# Patient Record
Sex: FEMALE | Race: OTHER | HISPANIC OR LATINO | URBAN - METROPOLITAN AREA
[De-identification: names, ages, dates, MRNs, and addresses within clinical notes are randomized per-mention and may not be internally consistent; named-entity substitution may affect disease eponyms.]

---

## 2024-01-23 ENCOUNTER — OUTPATIENT (OUTPATIENT)
Dept: INPATIENT UNIT | Facility: HOSPITAL | Age: 34
LOS: 1 days | Discharge: HOME | End: 2024-01-23
Payer: MEDICAID

## 2024-01-23 DIAGNOSIS — O26.899 OTHER SPECIFIED PREGNANCY RELATED CONDITIONS, UNSPECIFIED TRIMESTER: ICD-10-CM

## 2024-01-23 PROCEDURE — 59025 FETAL NON-STRESS TEST: CPT | Mod: 26

## 2024-01-23 PROCEDURE — 99222 1ST HOSP IP/OBS MODERATE 55: CPT | Mod: 25

## 2024-01-24 ENCOUNTER — ASOB RESULT (OUTPATIENT)
Age: 34
End: 2024-01-24

## 2024-01-24 ENCOUNTER — EMERGENCY (EMERGENCY)
Facility: HOSPITAL | Age: 34
LOS: 1 days | Discharge: STILL ADMITTED OR ADMINISTRATIVE | End: 2024-01-24
Admitting: EMERGENCY MEDICINE
Payer: MEDICAID

## 2024-01-24 ENCOUNTER — APPOINTMENT (OUTPATIENT)
Dept: ANTEPARTUM | Facility: CLINIC | Age: 34
End: 2024-01-24
Payer: MEDICAID

## 2024-01-24 ENCOUNTER — OUTPATIENT (OUTPATIENT)
Dept: INPATIENT UNIT | Facility: HOSPITAL | Age: 34
LOS: 1 days | Discharge: HOME | End: 2024-01-24
Payer: MEDICAID

## 2024-01-24 VITALS — HEART RATE: 76 BPM | DIASTOLIC BLOOD PRESSURE: 55 MMHG | SYSTOLIC BLOOD PRESSURE: 104 MMHG

## 2024-01-24 VITALS
RESPIRATION RATE: 18 BRPM | HEART RATE: 85 BPM | SYSTOLIC BLOOD PRESSURE: 112 MMHG | DIASTOLIC BLOOD PRESSURE: 66 MMHG | TEMPERATURE: 98 F

## 2024-01-24 VITALS
SYSTOLIC BLOOD PRESSURE: 98 MMHG | HEART RATE: 93 BPM | RESPIRATION RATE: 16 BRPM | DIASTOLIC BLOOD PRESSURE: 69 MMHG | TEMPERATURE: 99 F

## 2024-01-24 VITALS — DIASTOLIC BLOOD PRESSURE: 55 MMHG | HEART RATE: 84 BPM | SYSTOLIC BLOOD PRESSURE: 99 MMHG

## 2024-01-24 VITALS
OXYGEN SATURATION: 100 % | RESPIRATION RATE: 24 BRPM | DIASTOLIC BLOOD PRESSURE: 75 MMHG | TEMPERATURE: 98 F | HEART RATE: 97 BPM | SYSTOLIC BLOOD PRESSURE: 112 MMHG

## 2024-01-24 DIAGNOSIS — Z98.891 HISTORY OF UTERINE SCAR FROM PREVIOUS SURGERY: Chronic | ICD-10-CM

## 2024-01-24 DIAGNOSIS — O26.899 OTHER SPECIFIED PREGNANCY RELATED CONDITIONS, UNSPECIFIED TRIMESTER: ICD-10-CM

## 2024-01-24 LAB
ALBUMIN SERPL ELPH-MCNC: 3.5 G/DL — SIGNIFICANT CHANGE UP (ref 3.3–5)
ALBUMIN SERPL ELPH-MCNC: 4.1 G/DL — SIGNIFICANT CHANGE UP (ref 3.3–5)
ALP SERPL-CCNC: 51 U/L — SIGNIFICANT CHANGE UP (ref 40–120)
ALP SERPL-CCNC: 52 U/L — SIGNIFICANT CHANGE UP (ref 40–120)
ALT FLD-CCNC: 11 U/L — SIGNIFICANT CHANGE UP (ref 4–33)
ALT FLD-CCNC: 7 U/L — SIGNIFICANT CHANGE UP (ref 4–33)
AMYLASE P1 CFR SERPL: 52 U/L — SIGNIFICANT CHANGE UP (ref 25–125)
AMYLASE P1 CFR SERPL: 75 U/L — SIGNIFICANT CHANGE UP (ref 25–125)
ANION GAP SERPL CALC-SCNC: 10 MMOL/L — SIGNIFICANT CHANGE UP (ref 7–14)
ANION GAP SERPL CALC-SCNC: 12 MMOL/L — SIGNIFICANT CHANGE UP (ref 7–14)
APPEARANCE UR: CLEAR — SIGNIFICANT CHANGE UP
AST SERPL-CCNC: 12 U/L — SIGNIFICANT CHANGE UP (ref 4–32)
AST SERPL-CCNC: 16 U/L — SIGNIFICANT CHANGE UP (ref 4–32)
BASOPHILS # BLD AUTO: 0.03 K/UL — SIGNIFICANT CHANGE UP (ref 0–0.2)
BASOPHILS # BLD AUTO: 0.04 K/UL — SIGNIFICANT CHANGE UP (ref 0–0.2)
BASOPHILS NFR BLD AUTO: 0.2 % — SIGNIFICANT CHANGE UP (ref 0–2)
BASOPHILS NFR BLD AUTO: 0.3 % — SIGNIFICANT CHANGE UP (ref 0–2)
BILIRUB SERPL-MCNC: 0.3 MG/DL — SIGNIFICANT CHANGE UP (ref 0.2–1.2)
BILIRUB SERPL-MCNC: 0.4 MG/DL — SIGNIFICANT CHANGE UP (ref 0.2–1.2)
BILIRUB UR-MCNC: NEGATIVE — SIGNIFICANT CHANGE UP
BUN SERPL-MCNC: 4 MG/DL — LOW (ref 7–23)
BUN SERPL-MCNC: 6 MG/DL — LOW (ref 7–23)
CALCIUM SERPL-MCNC: 9.1 MG/DL — SIGNIFICANT CHANGE UP (ref 8.4–10.5)
CALCIUM SERPL-MCNC: 9.4 MG/DL — SIGNIFICANT CHANGE UP (ref 8.4–10.5)
CHLORIDE SERPL-SCNC: 101 MMOL/L — SIGNIFICANT CHANGE UP (ref 98–107)
CHLORIDE SERPL-SCNC: 102 MMOL/L — SIGNIFICANT CHANGE UP (ref 98–107)
CO2 SERPL-SCNC: 21 MMOL/L — LOW (ref 22–31)
CO2 SERPL-SCNC: 22 MMOL/L — SIGNIFICANT CHANGE UP (ref 22–31)
COLOR SPEC: YELLOW — SIGNIFICANT CHANGE UP
CREAT SERPL-MCNC: 0.39 MG/DL — LOW (ref 0.5–1.3)
CREAT SERPL-MCNC: 0.39 MG/DL — LOW (ref 0.5–1.3)
DIFF PNL FLD: NEGATIVE — SIGNIFICANT CHANGE UP
EGFR: 134 ML/MIN/1.73M2 — SIGNIFICANT CHANGE UP
EGFR: 134 ML/MIN/1.73M2 — SIGNIFICANT CHANGE UP
EOSINOPHIL # BLD AUTO: 0.08 K/UL — SIGNIFICANT CHANGE UP (ref 0–0.5)
EOSINOPHIL # BLD AUTO: 0.14 K/UL — SIGNIFICANT CHANGE UP (ref 0–0.5)
EOSINOPHIL NFR BLD AUTO: 0.6 % — SIGNIFICANT CHANGE UP (ref 0–6)
EOSINOPHIL NFR BLD AUTO: 1.1 % — SIGNIFICANT CHANGE UP (ref 0–6)
GLUCOSE SERPL-MCNC: 91 MG/DL — SIGNIFICANT CHANGE UP (ref 70–99)
GLUCOSE SERPL-MCNC: 93 MG/DL — SIGNIFICANT CHANGE UP (ref 70–99)
GLUCOSE UR QL: NEGATIVE MG/DL — SIGNIFICANT CHANGE UP
HCT VFR BLD CALC: 33.7 % — LOW (ref 34.5–45)
HCT VFR BLD CALC: 36.1 % — SIGNIFICANT CHANGE UP (ref 34.5–45)
HGB BLD-MCNC: 11.7 G/DL — SIGNIFICANT CHANGE UP (ref 11.5–15.5)
HGB BLD-MCNC: 12.5 G/DL — SIGNIFICANT CHANGE UP (ref 11.5–15.5)
IANC: 10.04 K/UL — HIGH (ref 1.8–7.4)
IANC: 9.48 K/UL — HIGH (ref 1.8–7.4)
IMM GRANULOCYTES NFR BLD AUTO: 0.3 % — SIGNIFICANT CHANGE UP (ref 0–0.9)
IMM GRANULOCYTES NFR BLD AUTO: 0.6 % — SIGNIFICANT CHANGE UP (ref 0–0.9)
KETONES UR-MCNC: NEGATIVE MG/DL — SIGNIFICANT CHANGE UP
LEUKOCYTE ESTERASE UR-ACNC: NEGATIVE — SIGNIFICANT CHANGE UP
LIDOCAIN IGE QN: 17 U/L — SIGNIFICANT CHANGE UP (ref 7–60)
LIDOCAIN IGE QN: 26 U/L — SIGNIFICANT CHANGE UP (ref 7–60)
LYMPHOCYTES # BLD AUTO: 1.97 K/UL — SIGNIFICANT CHANGE UP (ref 1–3.3)
LYMPHOCYTES # BLD AUTO: 15.5 % — SIGNIFICANT CHANGE UP (ref 13–44)
LYMPHOCYTES # BLD AUTO: 16.2 % — SIGNIFICANT CHANGE UP (ref 13–44)
LYMPHOCYTES # BLD AUTO: 2.14 K/UL — SIGNIFICANT CHANGE UP (ref 1–3.3)
MCHC RBC-ENTMCNC: 31.5 PG — SIGNIFICANT CHANGE UP (ref 27–34)
MCHC RBC-ENTMCNC: 31.7 PG — SIGNIFICANT CHANGE UP (ref 27–34)
MCHC RBC-ENTMCNC: 34.6 GM/DL — SIGNIFICANT CHANGE UP (ref 32–36)
MCHC RBC-ENTMCNC: 34.7 GM/DL — SIGNIFICANT CHANGE UP (ref 32–36)
MCV RBC AUTO: 90.6 FL — SIGNIFICANT CHANGE UP (ref 80–100)
MCV RBC AUTO: 91.6 FL — SIGNIFICANT CHANGE UP (ref 80–100)
MONOCYTES # BLD AUTO: 0.89 K/UL — SIGNIFICANT CHANGE UP (ref 0–0.9)
MONOCYTES # BLD AUTO: 1.02 K/UL — HIGH (ref 0–0.9)
MONOCYTES NFR BLD AUTO: 6.7 % — SIGNIFICANT CHANGE UP (ref 2–14)
MONOCYTES NFR BLD AUTO: 8 % — SIGNIFICANT CHANGE UP (ref 2–14)
NEUTROPHILS # BLD AUTO: 10.04 K/UL — HIGH (ref 1.8–7.4)
NEUTROPHILS # BLD AUTO: 9.48 K/UL — HIGH (ref 1.8–7.4)
NEUTROPHILS NFR BLD AUTO: 74.5 % — SIGNIFICANT CHANGE UP (ref 43–77)
NEUTROPHILS NFR BLD AUTO: 76 % — SIGNIFICANT CHANGE UP (ref 43–77)
NITRITE UR-MCNC: NEGATIVE — SIGNIFICANT CHANGE UP
NRBC # BLD: 0 /100 WBCS — SIGNIFICANT CHANGE UP (ref 0–0)
NRBC # BLD: 0 /100 WBCS — SIGNIFICANT CHANGE UP (ref 0–0)
NRBC # FLD: 0 K/UL — SIGNIFICANT CHANGE UP (ref 0–0)
NRBC # FLD: 0 K/UL — SIGNIFICANT CHANGE UP (ref 0–0)
PH UR: 6.5 — SIGNIFICANT CHANGE UP (ref 5–8)
PLATELET # BLD AUTO: 210 K/UL — SIGNIFICANT CHANGE UP (ref 150–400)
PLATELET # BLD AUTO: 219 K/UL — SIGNIFICANT CHANGE UP (ref 150–400)
POTASSIUM SERPL-MCNC: 3.6 MMOL/L — SIGNIFICANT CHANGE UP (ref 3.5–5.3)
POTASSIUM SERPL-MCNC: 3.7 MMOL/L — SIGNIFICANT CHANGE UP (ref 3.5–5.3)
POTASSIUM SERPL-SCNC: 3.6 MMOL/L — SIGNIFICANT CHANGE UP (ref 3.5–5.3)
POTASSIUM SERPL-SCNC: 3.7 MMOL/L — SIGNIFICANT CHANGE UP (ref 3.5–5.3)
PROT SERPL-MCNC: 6.7 G/DL — SIGNIFICANT CHANGE UP (ref 6–8.3)
PROT SERPL-MCNC: 7.5 G/DL — SIGNIFICANT CHANGE UP (ref 6–8.3)
PROT UR-MCNC: NEGATIVE MG/DL — SIGNIFICANT CHANGE UP
RBC # BLD: 3.72 M/UL — LOW (ref 3.8–5.2)
RBC # BLD: 3.94 M/UL — SIGNIFICANT CHANGE UP (ref 3.8–5.2)
RBC # FLD: 12.6 % — SIGNIFICANT CHANGE UP (ref 10.3–14.5)
RBC # FLD: 12.7 % — SIGNIFICANT CHANGE UP (ref 10.3–14.5)
SODIUM SERPL-SCNC: 134 MMOL/L — LOW (ref 135–145)
SODIUM SERPL-SCNC: 134 MMOL/L — LOW (ref 135–145)
SP GR SPEC: 1.01 — SIGNIFICANT CHANGE UP (ref 1–1.03)
UROBILINOGEN FLD QL: 0.2 MG/DL — SIGNIFICANT CHANGE UP (ref 0.2–1)
WBC # BLD: 12.72 K/UL — HIGH (ref 3.8–10.5)
WBC # BLD: 13.22 K/UL — HIGH (ref 3.8–10.5)
WBC # FLD AUTO: 12.72 K/UL — HIGH (ref 3.8–10.5)
WBC # FLD AUTO: 13.22 K/UL — HIGH (ref 3.8–10.5)

## 2024-01-24 PROCEDURE — 76817 TRANSVAGINAL US OBSTETRIC: CPT | Mod: 26

## 2024-01-24 PROCEDURE — 99214 OFFICE O/P EST MOD 30 MIN: CPT

## 2024-01-24 PROCEDURE — 76815 OB US LIMITED FETUS(S): CPT | Mod: 26

## 2024-01-24 PROCEDURE — 72195 MRI PELVIS W/O DYE: CPT | Mod: 26,MA

## 2024-01-24 PROCEDURE — 74181 MRI ABDOMEN W/O CONTRAST: CPT | Mod: 26,MG

## 2024-01-24 PROCEDURE — G1004: CPT

## 2024-01-24 PROCEDURE — L9996: CPT

## 2024-01-24 RX ORDER — INDOMETHACIN 50 MG
50 CAPSULE ORAL ONCE
Refills: 0 | Status: DISCONTINUED | OUTPATIENT
Start: 2024-01-24 | End: 2024-01-24

## 2024-01-24 RX ORDER — INDOMETHACIN 50 MG
50 CAPSULE ORAL ONCE
Refills: 0 | Status: COMPLETED | OUTPATIENT
Start: 2024-01-24 | End: 2024-01-24

## 2024-01-24 RX ORDER — FAMOTIDINE 10 MG/ML
20 INJECTION INTRAVENOUS ONCE
Refills: 0 | Status: COMPLETED | OUTPATIENT
Start: 2024-01-24 | End: 2024-01-24

## 2024-01-24 RX ORDER — ACETAMINOPHEN 500 MG
1000 TABLET ORAL ONCE
Refills: 0 | Status: COMPLETED | OUTPATIENT
Start: 2024-01-24 | End: 2024-01-24

## 2024-01-24 RX ORDER — INDOMETHACIN 50 MG
25 CAPSULE ORAL ONCE
Refills: 0 | Status: DISCONTINUED | OUTPATIENT
Start: 2024-01-24 | End: 2024-01-24

## 2024-01-24 RX ORDER — SODIUM CHLORIDE 9 MG/ML
500 INJECTION, SOLUTION INTRAVENOUS ONCE
Refills: 0 | Status: COMPLETED | OUTPATIENT
Start: 2024-01-24 | End: 2024-01-24

## 2024-01-24 RX ORDER — SODIUM CHLORIDE 9 MG/ML
1000 INJECTION, SOLUTION INTRAVENOUS
Refills: 0 | Status: DISCONTINUED | OUTPATIENT
Start: 2024-01-24 | End: 2024-02-08

## 2024-01-24 RX ORDER — SODIUM CHLORIDE 9 MG/ML
1000 INJECTION, SOLUTION INTRAVENOUS ONCE
Refills: 0 | Status: COMPLETED | OUTPATIENT
Start: 2024-01-24 | End: 2024-01-24

## 2024-01-24 RX ORDER — METOCLOPRAMIDE HCL 10 MG
10 TABLET ORAL ONCE
Refills: 0 | Status: COMPLETED | OUTPATIENT
Start: 2024-01-24 | End: 2024-01-24

## 2024-01-24 RX ORDER — ONDANSETRON 8 MG/1
8 TABLET, FILM COATED ORAL ONCE
Refills: 0 | Status: COMPLETED | OUTPATIENT
Start: 2024-01-24 | End: 2024-01-24

## 2024-01-24 RX ADMIN — Medication 10 MILLIGRAM(S): at 02:13

## 2024-01-24 RX ADMIN — SODIUM CHLORIDE 1000 MILLILITER(S): 9 INJECTION, SOLUTION INTRAVENOUS at 02:10

## 2024-01-24 RX ADMIN — FAMOTIDINE 20 MILLIGRAM(S): 10 INJECTION INTRAVENOUS at 16:38

## 2024-01-24 RX ADMIN — Medication 1000 MILLIGRAM(S): at 02:18

## 2024-01-24 RX ADMIN — Medication 400 MILLIGRAM(S): at 16:31

## 2024-01-24 RX ADMIN — SODIUM CHLORIDE 500 MILLILITER(S): 9 INJECTION, SOLUTION INTRAVENOUS at 16:31

## 2024-01-24 RX ADMIN — ONDANSETRON 8 MILLIGRAM(S): 8 TABLET, FILM COATED ORAL at 16:35

## 2024-01-24 RX ADMIN — Medication 50 MILLIGRAM(S): at 17:49

## 2024-01-24 NOTE — OB RN TRIAGE NOTE - NSICDXPASTMEDICALHX_GEN_ALL_CORE_FT
PAST MEDICAL HISTORY:  Anemia     H/O gastritis      PAST MEDICAL HISTORY:  Anemia     Fibroid     H/O gastritis

## 2024-01-24 NOTE — OB PROVIDER TRIAGE NOTE - HISTORY OF PRESENT ILLNESS
Pt. is a 34y/o  EGA 17.1wks reports of vomiting x2 episodes and lower abdominal cramping since yesterday. Pt. denies leakage of fluid and vaginal bleeding. Pt. states currently she no longer feels Pt. receives prenatal care from [facility].

## 2024-01-24 NOTE — OB PROVIDER TRIAGE NOTE - NSHPPHYSICALEXAM_GEN_ALL_CORE
T(C): 37.0 (01-24-24 @ 13:34), Max: 37 (01-24-24 @ 12:54)  HR: 80 (01-24-24 @ 16:09) (61 - 97)  BP: 106/57 (01-24-24 @ 16:09) (85/53 - 115/56)  RR: 16 (01-24-24 @ 12:54) (16 - 24)  SpO2: 100% (01-24-24 @ 12:05) (100% - 100%)  General: Female visibly uncomfortable.   Head: Normocephalic. Atraumatic.   Eyes: No discharge, lids normal, conjunctiva normal  Lungs: No resp distress  Abdomen: Soft, no guarding, rebound, rigidity. There is reproducible TTP overlying the 8cm right lateral myoma. Neg spain's. No CVAT.   TAUS:  Sono saved in ASOB.   SSE: No erythema, edema, lesions to external genitalia. Physiologic discharge present. No active, brisk bleeding.  Negative pooling.   Neuro: No facial asymmetry, no slurred speech, moves all 4 extremities  Mood: Alert and lucid, appropriate mood and affect

## 2024-01-24 NOTE — OB PROVIDER TRIAGE NOTE - ADDITIONAL INSTRUCTIONS
please drink 1-2 liters of fluid per day/   increase fiber in her diet/green leave vegetables and stool Softerner such as Colace, Senakot and Miralax.   follow up with OB for the next schedule appointment

## 2024-01-24 NOTE — OB PROVIDER TRIAGE NOTE - NSOBPROVIDERNOTE_OBGYN_ALL_OB_FT
Pt. states she no longer feels pain. CBC, CMP, Amylase/Lipase and UA ordered   1L RL bolus ordered and given   1000mg Tylenol IVPB ordered and given     Pt. resting comfortably in bed.   Pt. states she no longer feels pain.    @345a  PO challenge initiated     @0405a  Pt. sleeping comfortably in bed   Pt. states she tolerated PO fluids without vomiting    @0415a  Discussed findings with Dr. Santana/Dr. Bailey.  Pt. d/c'd home. Pt. to follow up with next OB appointment. Pt. instructed to return to triage with increase abdominal contractions, leakage of fluids, or decrease fetal movement. Increase oral hydration encouraged.

## 2024-01-24 NOTE — CONSULT NOTE ADULT - SUBJECTIVE AND OBJECTIVE BOX
GENERAL SURGERY CONSULT NOTE  Consulting surgical team: B team surgery  Consulting attending: Dr. Yu     HPI:  HPI:  33F 17w1d presents with 3 days of abdominal pain. Reports nausea and vomiting, however have been persistently nauseous throughout her pregnancy. Denies fevers, chills, sob, cp.     VSS. WBC 13. MRI prelim reads normal appendix.       PAST MEDICAL HISTORY:  Anemia    H/O gastritis    Fibroid        PAST SURGICAL HISTORY:  No significant past surgical history    No significant past surgical history    Previous  section        SOCIAL HISTORY:  - Denies EtOH abuse, smoking, IVDA    MEDICATIONS:  lactated ringers. 1000 milliLiter(s) IV Continuous <Continuous>      ALLERGIES:  Pepto-Bismol Diarrhea (Rash)      VITALS & I/Os:  Vital Signs Last 24 Hrs  T(C): 37.0 (2024 16:11), Max: 37 (2024 12:54)  T(F): 98.6 (2024 16:11), Max: 98.6 (2024 12:54)  HR: 76 (2024 20:33) (61 - 97)  BP: 104/55 (2024 20:33) (85/53 - 115/56)  BP(mean): --  RR: 16 (2024 12:54) (16 - 24)  SpO2: 100% (2024 12:05) (100% - 100%)        I&O's Summary    2024 07:01  -  2024 23:40  --------------------------------------------------------  IN: 750 mL / OUT: 0 mL / NET: 750 mL        PHYSICAL EXAM:  GEN: resting comfortably in bed, in NAD  RESP: no acute respiratory distress, breathing comfortably   ABD: soft, non-distended, RLQ TTP.   EXT:  WWP, TALAVERA   NEURO:  no focal neuro deficits     LABS:                        11.7   13.22 )-----------( 210      ( 2024 20:52 )             33.7         134<L>  |  102  |  4<L>  ----------------------------<  91  3.6   |  22  |  0.39<L>    Ca    9.1      2024 20:52    TPro  6.7  /  Alb  3.5  /  TBili  0.4  /  DBili  x   /  AST  12  /  ALT  7   /  AlkPhos  51      Lactate:              Urinalysis Basic - ( 2024 20:52 )    Color: x / Appearance: x / SG: x / pH: x  Gluc: 91 mg/dL / Ketone: x  / Bili: x / Urobili: x   Blood: x / Protein: x / Nitrite: x   Leuk Esterase: x / RBC: x / WBC x   Sq Epi: x / Non Sq Epi: x / Bacteria: x        IMAGING:  < from: MR Pelvis No Cont (24 @ 22:30) >    INTERPRETATION:  normal appendix. follow up official report in the AM.    < end of copied text >

## 2024-01-24 NOTE — OB RN TRIAGE NOTE - FALL HARM RISK - UNIVERSAL INTERVENTIONS
Bed in lowest position, wheels locked, appropriate side rails in place/Call bell, personal items and telephone in reach/Instruct patient to call for assistance before getting out of bed or chair/Non-slip footwear when patient is out of bed/[place] to call system/Physically safe environment - no spills, clutter or unnecessary equipment/Purposeful Proactive Rounding/Room/bathroom lighting operational, light cord in reach

## 2024-01-24 NOTE — OB PROVIDER TRIAGE NOTE - NSHPLABSRESULTS_GEN_ALL_CORE
12.5   12.72 )-----------( 219      ( 2024 01:45 )             36.1         134<L>  |  101  |  6<L>  ----------------------------<  93  3.7   |  21<L>  |  0.39<L>    Ca    9.4      2024 01:45    TPro  7.5  /  Alb  4.1  /  TBili  0.3  /  DBili  x   /  AST  16  /  ALT  11  /  AlkPhos  52      Amylase:  Lipase:     Urinalysis Basic - ( 2024 01:45 )    Color: Yellow / Appearance: Clear / S.006 / pH: x  Gluc: 93 mg/dL / Ketone: Negative mg/dL  / Bili: Negative / Urobili: 0.2 mg/dL   Blood: x / Protein: Negative mg/dL / Nitrite: Negative   Leuk Esterase: Negative / RBC: x / WBC x   Sq Epi: x / Non Sq Epi: x / Bacteria: x

## 2024-01-24 NOTE — OB PROVIDER TRIAGE NOTE - NSOBPROVIDERNOTE_OBGYN_ALL_OB_FT
Ms. YEIMI ABRAHAM is a 33y  @ 17w1d p/w pelvic pain (R>L), n/v x 3 days w likely fibroid-related pain.     - Patient notes allergy to pepto-bismol, took in  or  and developed diffuse urticaria, was told by doctors not to take NSAIDs after this episode and so she has never taken ibuprofen since then. Given this, will avoid Indocin @ this time d/w Dr. Joseph. Calling pharmacy to confirm.     Plan  - 500cc bolus  - Pepcid 20mg IV   - Tylenol 1000mg IV  - Zofran 8mg, IV      Plan d/w Dr. Joseph, PGY3 d/w Dr. Esquivel Ms. YEIMI ABRAHAM is a 33y  @ 17w1d p/w pelvic pain (R>L), n/v x 3 days w likely fibroid-related pain.     - Patient notes allergy to pepto-bismol, took in  and developed diffuse urticaria, was told by doctors not to take NSAIDs after this episode and so she has never taken ibuprofen since then. Given this, will avoid Indocin @ this time d/w Dr. Joseph.   - Spoke w pharmacy, [facility]. Notes that patient can receive one-time dose if pt is amenable given that reaction was greater than 10 years. If reaction occurs, can tx with benadryl.    - Patient was informed of potential reaction given hx, patient would like to receive one-time dose despite risk of reaction. Pt notes she has taken advil after  but not during pregnancy, notes she has taken it prior to pregnancy without reaction     - Given 500cc bolus, Pepcid 20mg IV, Tylenol 1000mg IV, Zofran 8mg. Notes little change in pain after meds     Plan  - Indocin 50mg once, d/w Dr. Link, PGY4    Plan d/w Dr. Joseph, PGY3 d/w Dr. Esquivel Ms. YEIMI ABRAHAM is a 33y  @ 17w1d p/w pelvic pain (R>L), n/v x 3 days w likely fibroid-related pain.     - Patient notes allergy to pepto-bismol, took in  and developed diffuse urticaria, was told by doctors not to take NSAIDs after this episode and so she has never taken ibuprofen since then. Given this, will avoid Indocin @ this time d/w Dr. Joseph.   - Spoke w pharmacy, [place]. Notes that patient can receive one-time dose if pt is amenable given that reaction was greater than 10 years. If reaction occurs, can tx with benadryl.    - Patient was informed of potential reaction given hx, patient would like to receive one-time dose despite risk of reaction. Pt notes she has taken advil after  but not during pregnancy, notes she has taken it prior to pregnancy without reaction     - Given 500cc bolus, Pepcid 20mg IV, Tylenol 1000mg IV, Zofran 8mg. Notes little change in pain after meds     Plan d/w Dr. Joseph, PGY3 d/w Dr. Esquivel    - Patient wo improvement of pain. Notes worsening of pain, 10/10 despite indocin     Plan  - MRI abdomen / pelvis - needs to be protocoled   - NPO  - Surgery consult     Signed out to night team   Plan d/w Dr. Franco Ms. YEIMI ABRAHAM is a 33y  @ 17w1d p/w pelvic pain (R>L), n/v x 3 days w likely fibroid-related pain.     - Patient notes allergy to pepto-bismol, took in  and developed diffuse urticaria, was told by doctors not to take NSAIDs after this episode and so she has never taken ibuprofen since then. Given this, will avoid Indocin @ this time d/w Dr. Joseph.   - Spoke w pharmacy, [facility]. Notes that patient can receive one-time dose if pt is amenable given that reaction was greater than 10 years. If reaction occurs, can tx with benadryl.    - Patient was informed of potential reaction given hx, patient would like to receive one-time dose despite risk of reaction. Pt notes she has taken advil after  but not during pregnancy, notes she has taken it prior to pregnancy without reaction     - Given 500cc bolus, Pepcid 20mg IV, Tylenol 1000mg IV, Zofran 8mg. Notes little change in pain after meds     Plan d/w Dr. Joseph, PGY3 d/w Dr. Esquivel    - Patient wo improvement of pain. Notes worsening of pain, 10/10 despite indocin     Plan  - MRI abdomen / pelvis - needs to be protocoled   - NPO  - Surgery consult     Signed out to night team   Plan d/w Dr. Franco    note addendum  2217 pt report that her pain is better now 6/10  MRI negative for Appendicitis  Pt is impacted with Stool.   Pt report that she had BM this Am, yesterday and . Pt report constipation at times.  Pt instructed to drink 1-2 liters of fluid per day/ increase fiber in her diet/green leave vegetables and stool Softerner such as Colace, Senakot and Miralax.   Discharge patient home.   will follow up with OB for the next schedule appointment.  All ordered tests results reviewed and interpreted.  Plan of care was reviewed with patient and family; patient states understanding of the above plan.  Pt discharged home @ 2300     W. theo CALDWELL Ms. YEIMI ABRAHAM is a 33y  @ 17w1d p/w pelvic pain (R>L), n/v x 3 days w likely fibroid-related pain.     - Patient notes allergy to pepto-bismol, took in  and developed diffuse urticaria, was told by doctors not to take NSAIDs after this episode and so she has never taken ibuprofen since then. Given this, will avoid Indocin @ this time d/w Dr. Joseph.   - Spoke w pharmacy, [facility]. Notes that patient can receive one-time dose if pt is amenable given that reaction was greater than 10 years. If reaction occurs, can tx with benadryl.    - Patient was informed of potential reaction given hx, patient would like to receive one-time dose despite risk of reaction. Pt notes she has taken advil after  but not during pregnancy, notes she has taken it prior to pregnancy without reaction     - Given 500cc bolus, Pepcid 20mg IV, Tylenol 1000mg IV, Zofran 8mg. Notes little change in pain after meds     Plan d/w Dr. Joseph, PGY3 d/w Dr. Esquivel    - Patient wo improvement of pain. Notes worsening of pain, 10/10 despite indocin     Plan  - MRI abdomen / pelvis - needs to be protocoled  - NPO  - Surgery consult     Signed out to night team   Plan d/w Dr. Franco    note addendum  MRI protocol sheet and consent discuss and sign with patient with   8438 pt report that her pain is better now 6/10  MRI negative for Appendicitis  Pt is impacted with Stool.   Pt report that she had BM this Am, yesterday and . Pt report constipation at times.  Pt instructed to drink 1-2 liters of fluid per day/ increase fiber in her diet/green leave vegetables and stool Softerner such as Colace, Senakot and Miralax.   Discharge patient home.   will follow up with OB for the next schedule appointment.  All ordered tests results reviewed and interpreted with    Plan of care was reviewed with patient and family; patient states understanding of the above plan.  Discharge instructions given via  (Julio César # [id number])  Pt discharged home @ 2300     W. theo CALDWELL

## 2024-01-24 NOTE — OB PROVIDER TRIAGE NOTE - ADDITIONAL INSTRUCTIONS
Pt. d/c'd home. Pt. to follow up with next OB appointment. Pt. instructed to return to triage with increase abdominal contractions, leakage of fluids, or decrease fetal movement. Increase oral hydration encouraged.

## 2024-01-24 NOTE — ED ADULT TRIAGE NOTE - CHIEF COMPLAINT QUOTE
Patient c/o RLQ pain x 3 days, reports nausea and vomiting. Reports 17 weeks pregnant, CODY in June, does not know exact date, MD Beebe, baby is moving normally. Reports myoma. Denies fever, vaginal bleeding. Denies phx Patient c/o RLQ pain x 3 days, reports nausea and vomiting. Reports 17 weeks pregnant, CODY in June, does not know exact date, MD Beebe, baby is moving normally. Reports myoma. Denies fever, vaginal bleeding. Denies phx. Patient to go to L&D per ACP Elena

## 2024-01-24 NOTE — OB PROVIDER TRIAGE NOTE - NSHPPHYSICALEXAM_GEN_ALL_CORE
ICU Vital Signs Last 24 Hrs  T(C): --  T(F): --  HR: 61 (24 Jan 2024 03:26) (61 - 84)  BP: 86/54 (24 Jan 2024 03:26) (85/53 - 115/56)  BP(mean): --  ABP: --  ABP(mean): --  RR: --  SpO2: --    Abdomen soft nontender  TAS: Breech presentation, posterior placenta, FHR: 130bpm, MVP: 4.23,   3.24x3.05 fibroid noted in lower uterine segment

## 2024-01-24 NOTE — OB RN TRIAGE NOTE - NSNURSINGINSTR_OBGYN_ALL_OB_FT
Patient discharged home with Albanian discharge instructions.   used. Patient verbalizes understanding.  All questions answered.  IV saline lock removed.

## 2024-01-24 NOTE — OB PROVIDER TRIAGE NOTE - HISTORY OF PRESENT ILLNESS
Ms. YEIMI ABRAHAM is a 33y  @ 17w1d p/w pelvic pain (R>L), n/v x 3 days. 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 Pain has been constant but waxes and wanes with intensity.  Vomited 6x in last 24 hours, last episode was at 1pm after eating some fruits at 11a. Pain repeating Currently /10. Denies lof, vb, fevers, chills, abd trauma, dysuria.     PNC: QHC.   3 myomas   - ant degeneration 3.2x3.x2x2.8  - anterior 3.54x4.06 x 2.41  - fundal right lateral 8.7x8.6x8

## 2024-01-24 NOTE — CONSULT NOTE ADULT - ASSESSMENT
33F 17w1d presents with 3 days of abdominal pain. Surgery c/s for r/o appendicitis. Appendix appears normal of prelim MRI.     Plan:  - No acute surgical intervention   - Rest of care per OB     Plan discussed with Dr. Gigi Cameron MD, PGY-3   B team surgery   [id number]

## 2024-01-25 ENCOUNTER — EMERGENCY (EMERGENCY)
Facility: HOSPITAL | Age: 34
LOS: 1 days | Discharge: STILL ADMITTED OR ADMINISTRATIVE | End: 2024-01-25
Admitting: EMERGENCY MEDICINE
Payer: MEDICAID

## 2024-01-25 ENCOUNTER — OUTPATIENT (OUTPATIENT)
Dept: INPATIENT UNIT | Facility: HOSPITAL | Age: 34
LOS: 1 days | Discharge: HOME | End: 2024-01-25
Payer: MEDICAID

## 2024-01-25 VITALS
HEART RATE: 100 BPM | TEMPERATURE: 98 F | SYSTOLIC BLOOD PRESSURE: 137 MMHG | OXYGEN SATURATION: 100 % | RESPIRATION RATE: 20 BRPM | DIASTOLIC BLOOD PRESSURE: 85 MMHG

## 2024-01-25 VITALS — TEMPERATURE: 98 F

## 2024-01-25 DIAGNOSIS — Z98.891 HISTORY OF UTERINE SCAR FROM PREVIOUS SURGERY: Chronic | ICD-10-CM

## 2024-01-25 DIAGNOSIS — O26.892 OTHER SPECIFIED PREGNANCY RELATED CONDITIONS, SECOND TRIMESTER: ICD-10-CM

## 2024-01-25 DIAGNOSIS — O21.2 LATE VOMITING OF PREGNANCY: ICD-10-CM

## 2024-01-25 DIAGNOSIS — R10.30 LOWER ABDOMINAL PAIN, UNSPECIFIED: ICD-10-CM

## 2024-01-25 DIAGNOSIS — Z3A.17 17 WEEKS GESTATION OF PREGNANCY: ICD-10-CM

## 2024-01-25 DIAGNOSIS — O26.899 OTHER SPECIFIED PREGNANCY RELATED CONDITIONS, UNSPECIFIED TRIMESTER: ICD-10-CM

## 2024-01-25 DIAGNOSIS — O34.219 MATERNAL CARE FOR UNSPECIFIED TYPE SCAR FROM PREVIOUS CESAREAN DELIVERY: ICD-10-CM

## 2024-01-25 LAB
ALBUMIN SERPL ELPH-MCNC: 3.8 G/DL — SIGNIFICANT CHANGE UP (ref 3.3–5)
ALP SERPL-CCNC: 58 U/L — SIGNIFICANT CHANGE UP (ref 40–120)
ALT FLD-CCNC: 13 U/L — SIGNIFICANT CHANGE UP (ref 4–33)
ANION GAP SERPL CALC-SCNC: 14 MMOL/L — SIGNIFICANT CHANGE UP (ref 7–14)
APPEARANCE UR: CLEAR — SIGNIFICANT CHANGE UP
AST SERPL-CCNC: 13 U/L — SIGNIFICANT CHANGE UP (ref 4–32)
BASOPHILS # BLD AUTO: 0.06 K/UL — SIGNIFICANT CHANGE UP (ref 0–0.2)
BASOPHILS NFR BLD AUTO: 0.4 % — SIGNIFICANT CHANGE UP (ref 0–2)
BILIRUB SERPL-MCNC: 0.2 MG/DL — SIGNIFICANT CHANGE UP (ref 0.2–1.2)
BILIRUB UR-MCNC: NEGATIVE — SIGNIFICANT CHANGE UP
BUN SERPL-MCNC: 5 MG/DL — LOW (ref 7–23)
CALCIUM SERPL-MCNC: 9.2 MG/DL — SIGNIFICANT CHANGE UP (ref 8.4–10.5)
CHLORIDE SERPL-SCNC: 100 MMOL/L — SIGNIFICANT CHANGE UP (ref 98–107)
CO2 SERPL-SCNC: 22 MMOL/L — SIGNIFICANT CHANGE UP (ref 22–31)
COLOR SPEC: YELLOW — SIGNIFICANT CHANGE UP
CREAT SERPL-MCNC: 0.38 MG/DL — LOW (ref 0.5–1.3)
DIFF PNL FLD: NEGATIVE — SIGNIFICANT CHANGE UP
EGFR: 135 ML/MIN/1.73M2 — SIGNIFICANT CHANGE UP
EOSINOPHIL # BLD AUTO: 0.16 K/UL — SIGNIFICANT CHANGE UP (ref 0–0.5)
EOSINOPHIL NFR BLD AUTO: 1.1 % — SIGNIFICANT CHANGE UP (ref 0–6)
GLUCOSE SERPL-MCNC: 86 MG/DL — SIGNIFICANT CHANGE UP (ref 70–99)
GLUCOSE UR QL: NEGATIVE MG/DL — SIGNIFICANT CHANGE UP
HCT VFR BLD CALC: 35.9 % — SIGNIFICANT CHANGE UP (ref 34.5–45)
HGB BLD-MCNC: 12.1 G/DL — SIGNIFICANT CHANGE UP (ref 11.5–15.5)
IANC: 10.89 K/UL — HIGH (ref 1.8–7.4)
IMM GRANULOCYTES NFR BLD AUTO: 0.5 % — SIGNIFICANT CHANGE UP (ref 0–0.9)
KETONES UR-MCNC: NEGATIVE MG/DL — SIGNIFICANT CHANGE UP
LEUKOCYTE ESTERASE UR-ACNC: NEGATIVE — SIGNIFICANT CHANGE UP
LYMPHOCYTES # BLD AUTO: 14.7 % — SIGNIFICANT CHANGE UP (ref 13–44)
LYMPHOCYTES # BLD AUTO: 2.13 K/UL — SIGNIFICANT CHANGE UP (ref 1–3.3)
MCHC RBC-ENTMCNC: 31.3 PG — SIGNIFICANT CHANGE UP (ref 27–34)
MCHC RBC-ENTMCNC: 33.7 GM/DL — SIGNIFICANT CHANGE UP (ref 32–36)
MCV RBC AUTO: 93 FL — SIGNIFICANT CHANGE UP (ref 80–100)
MONOCYTES # BLD AUTO: 1.2 K/UL — HIGH (ref 0–0.9)
MONOCYTES NFR BLD AUTO: 8.3 % — SIGNIFICANT CHANGE UP (ref 2–14)
NEUTROPHILS # BLD AUTO: 10.89 K/UL — HIGH (ref 1.8–7.4)
NEUTROPHILS NFR BLD AUTO: 75 % — SIGNIFICANT CHANGE UP (ref 43–77)
NITRITE UR-MCNC: NEGATIVE — SIGNIFICANT CHANGE UP
NRBC # BLD: 0 /100 WBCS — SIGNIFICANT CHANGE UP (ref 0–0)
NRBC # FLD: 0 K/UL — SIGNIFICANT CHANGE UP (ref 0–0)
PH UR: 6.5 — SIGNIFICANT CHANGE UP (ref 5–8)
PLATELET # BLD AUTO: 222 K/UL — SIGNIFICANT CHANGE UP (ref 150–400)
POTASSIUM SERPL-MCNC: 4.3 MMOL/L — SIGNIFICANT CHANGE UP (ref 3.5–5.3)
POTASSIUM SERPL-SCNC: 4.3 MMOL/L — SIGNIFICANT CHANGE UP (ref 3.5–5.3)
PROT SERPL-MCNC: 7.4 G/DL — SIGNIFICANT CHANGE UP (ref 6–8.3)
PROT UR-MCNC: NEGATIVE MG/DL — SIGNIFICANT CHANGE UP
RBC # BLD: 3.86 M/UL — SIGNIFICANT CHANGE UP (ref 3.8–5.2)
RBC # FLD: 12.8 % — SIGNIFICANT CHANGE UP (ref 10.3–14.5)
SODIUM SERPL-SCNC: 136 MMOL/L — SIGNIFICANT CHANGE UP (ref 135–145)
SP GR SPEC: 1 — SIGNIFICANT CHANGE UP (ref 1–1.03)
UROBILINOGEN FLD QL: 0.2 MG/DL — SIGNIFICANT CHANGE UP (ref 0.2–1)
WBC # BLD: 14.51 K/UL — HIGH (ref 3.8–10.5)
WBC # FLD AUTO: 14.51 K/UL — HIGH (ref 3.8–10.5)

## 2024-01-25 PROCEDURE — L9996: CPT

## 2024-01-25 PROCEDURE — 99221 1ST HOSP IP/OBS SF/LOW 40: CPT

## 2024-01-25 RX ORDER — SODIUM CHLORIDE 9 MG/ML
1000 INJECTION, SOLUTION INTRAVENOUS ONCE
Refills: 0 | Status: COMPLETED | OUTPATIENT
Start: 2024-01-25 | End: 2024-01-25

## 2024-01-25 RX ORDER — INDOMETHACIN 50 MG
25 CAPSULE ORAL ONCE
Refills: 0 | Status: COMPLETED | OUTPATIENT
Start: 2024-01-25 | End: 2024-01-25

## 2024-01-25 RX ADMIN — SODIUM CHLORIDE 1000 MILLILITER(S): 9 INJECTION, SOLUTION INTRAVENOUS at 22:55

## 2024-01-25 RX ADMIN — Medication 25 MILLIGRAM(S): at 23:22

## 2024-01-25 NOTE — OB PROVIDER TRIAGE NOTE - PLAN OF CARE
D/C Home  D/W Dr. Santana and Dr. Antonio  Fibroid pain  No evidence of acute process  Normal fetal testing for 17wks  Follow up at next scheduled prenatal appointment  Return for decreased fetal movement, loss of fluid or vaginal bleeding  Increase oral hydration  Indocin sent to given pharmacy  Tylenol as needed for fibroid pain, take as directed   Signs and Symptoms of Labor reviewed   Kick Counts Reviewed D/C Home  D/W Dr. Santana and Dr. Antonio  Fibroid pain  No evidence of acute process  Normal fetal testing for 17wks  Follow up at next scheduled prenatal appointment  Return for decreased fetal movement, loss of fluid or vaginal bleeding  Increase oral hydration  Indocin sent to given pharmacy  Tylenol as needed for fibroid pain, take as directed   Signs and Symptoms of Labor reviewed   Kick Counts Reviewed  d/c used with

## 2024-01-25 NOTE — OB PROVIDER TRIAGE NOTE - NSOBPROVIDERNOTE_OBGYN_ALL_OB_FT
23:15  Patient presented to Dr. Santana  Right Sided pain most likely due to 9x7.6cm degenerating fibroid from inhouse MRI and use of laxatives at home from diagnosis of stool impaction from MRI as well.  Indocin and LR Bolus ordered  CMP/CBC  Indocin flagged for Pepto-Bismol and NSAID? allergy, reviewed yesterday with pharmacist and patient took dose of 50mg without complications in triage 23:15  Patient presented to Dr. Santana  Right Sided pain most likely due to 9x7.6cm degenerating fibroid from inhouse MRI and use of laxatives at home from diagnosis of stool impaction from MRI as well.  Indocin and LR Bolus ordered  CMP/CBC  Interpretor used for plan of care Marco Antonio Bettencourt ID#[id number]  00:45  Patient sleeping, when aroused patient reports relief with the Indocin  1:05  Presented to Dr. Santana  Will be d/c with Indocin 23:15  Patient presented to Dr. Santana  Right Sided pain most likely due to 9x7.6cm degenerating fibroid from inhouse MRI and use of laxatives at home from diagnosis of stool impaction from MRI as well.  Indocin and LR Bolus ordered  CMP/CBC  Interpretor used for plan of care Pascual ID#[id number]  00:45  Patient sleeping, when aroused patient reports relief with the Indocin, 3/10 pain scale from 10/10  1:05  Presented to Dr. Santana  Will be d/c with Indocin

## 2024-01-25 NOTE — OB PROVIDER TRIAGE NOTE - ADDITIONAL INSTRUCTIONS
Follow up at next scheduled prenatal appointment  Return for decreased fetal movement, loss of fluid or vaginal bleeding  Increase oral hydration  Indocin sent to given pharmacy  Tylenol as needed for fibroid pain, take as directed   Signs and Symptoms of Labor reviewed   Kick Counts Reviewed

## 2024-01-25 NOTE — OB PROVIDER TRIAGE NOTE - NS_OBGYNHISTORY_OBGYN_ALL_OB_FT
GYN: Large Right Sided Fibroid, degenerating   OB:   1/28/2011 C/S 3988gm LGA      AP course uncomplicated

## 2024-01-25 NOTE — OB PROVIDER TRIAGE NOTE - NS_ATTENDINFORMED_OBGYN_ALL_OB
----- Message from Bennie Reza MD sent at 8/9/2020  7:59 PM CDT -----  Please notfiy parent that these are nl.  Thanks these results are very good.  Her liver functions are normal, her vitamin D is normal as is her ferritin. Thyroid is fine.  Only thing pending is vitamin B2.  I am out of town this weekend but will be reaching out to neuropsych as we had discussed at her well visit  Thanks     Da Antonio MD

## 2024-01-25 NOTE — OB PROVIDER TRIAGE NOTE - NSLASTDATEVISIT_OBGYN_ALL_OB
Contacted and spoke to Ms. Shearer, it was explained to her why Dr. Llanes had originally changed the date on the medication.     However he was able to send the medication to her pharmacy to be filled on 04/11/22.    Ms. Shearer thanks the team for taking care of her, and apologized for getting loud with staff, she was in pain and wasn't getting any relief.        Unknown

## 2024-01-25 NOTE — OB PROVIDER TRIAGE NOTE - HISTORY OF PRESENT ILLNESS
Statement Selected 32y/o  @17.2wks presents with abdominal pain x5days, this is the patients 3rd day here, & and today. She was discharged yesterday after a normal MRI but showed large right sided fibroid, where the patient is c/o of the pain today, 10/10 pain scale. She was sent home to take Miralax Senakot and Colace after MRI showed impacted which she did and has been having diarrhea x5 episodes today with cramping.  Reports good fetal movement  Denies LOF/VB    Allergies: Pepto-Bismol  Medications: PNV, Doxylamine, Miralax, Senakot and Colace      used Soniya ID# [id number] for history and physical   32y/o  @17.2wks presents with abdominal pain x5days, this is the patients 3rd day here, & and today. She was discharged yesterday after a normal MRI but showed large right sided fibroid, where the patient is c/o of the pain today, 10/10 pain scale. She was sent home to take Miralax Senakot and Colace after MRI showed impacted which she did and has been having diarrhea x5 episodes today with cramping.  Reports good fetal movement  Denies LOF/VB    Allergies: Pepto-Bismol  Medications: PNV, Doxylamine, Miralax, Senakot and Colace

## 2024-01-25 NOTE — ED ADULT TRIAGE NOTE - CHIEF COMPLAINT QUOTE
#[id number]- p/w RLQ pain x 4 days- approx 17 weeks, unknown CODY, LMP 10/6, MD Quintero  pt to be taken to L&D

## 2024-01-25 NOTE — OB PROVIDER TRIAGE NOTE - NSHPLABSRESULTS_GEN_ALL_CORE
Urinalysis Basic - ( 2024 22:22 )    Color: Yellow / Appearance: Clear / S.005 / pH: x  Gluc: x / Ketone: Negative mg/dL  / Bili: Negative / Urobili: 0.2 mg/dL   Blood: x / Protein: Negative mg/dL / Nitrite: Negative   Leuk Esterase: Negative / RBC: x / WBC x   Sq Epi: x / Non Sq Epi: x / Bacteria: x

## 2024-01-25 NOTE — ED ADULT NURSE NOTE - CHIEF COMPLAINT QUOTE
#[id number]- p/w RLQ pain x 4 days- approx 17 weeks, unknown CODY, LMP 10/6, MD Quintero  pt to be taken to L&D

## 2024-01-25 NOTE — OB PROVIDER TRIAGE NOTE - NSHPPHYSICALEXAM_GEN_ALL_CORE
Vital Signs Last 24 Hrs  T(C): 36.9 (25 Jan 2024 21:49), Max: 36.9 (25 Jan 2024 21:44)  T(F): 98.4 (25 Jan 2024 21:49), Max: 98.42 (25 Jan 2024 21:44)  HR: 91 (25 Jan 2024 21:49) (86 - 100)  BP: 120/73 (25 Jan 2024 21:49) (104/64 - 137/85)  RR: 17 (25 Jan 2024 21:49) (17 - 20)  SpO2: 100% (25 Jan 2024 21:03) (100% - 100%)    Assessment reveals VSS  General: Female sitting comfortably in no apparent distress  Neuro: No facial asymmetry, no slurred speech, moves all 4 extremities  Mood: Alert and lucid, appropriate mood and affect  A&Ox3  Lungs- clear bilateral  Heart- normal rate and regular rhythm  Extremities- Warm, Dry, no edema present, good pulses   Abdomen soft, NT, gravid  Transabdominal Ultrasound- images saved ASOB, vtx, post placenta, FHR: 149  Sterile Speculum- cervix appears closed   Transvaginal Ultrasound- cervical length 3.6-3.7cm, no funneling or dynamic changes       PLAN:  LR Bolus

## 2024-01-26 VITALS — SYSTOLIC BLOOD PRESSURE: 97 MMHG | HEART RATE: 71 BPM | DIASTOLIC BLOOD PRESSURE: 56 MMHG

## 2024-01-26 PROBLEM — Z87.19 PERSONAL HISTORY OF OTHER DISEASES OF THE DIGESTIVE SYSTEM: Chronic | Status: ACTIVE | Noted: 2024-01-24

## 2024-01-26 PROBLEM — D21.9 BENIGN NEOPLASM OF CONNECTIVE AND OTHER SOFT TISSUE, UNSPECIFIED: Chronic | Status: ACTIVE | Noted: 2024-01-24

## 2024-01-26 PROBLEM — D64.9 ANEMIA, UNSPECIFIED: Chronic | Status: ACTIVE | Noted: 2024-01-24

## 2024-01-26 RX ORDER — INDOMETHACIN 50 MG
1 CAPSULE ORAL
Qty: 8 | Refills: 0
Start: 2024-01-26 | End: 2024-01-27

## 2024-01-26 RX ADMIN — Medication 25 MILLIGRAM(S): at 00:20

## 2024-01-28 DIAGNOSIS — O99.512 DISEASES OF THE RESPIRATORY SYSTEM COMPLICATING PREGNANCY, SECOND TRIMESTER: ICD-10-CM

## 2024-01-28 DIAGNOSIS — Z3A.17 17 WEEKS GESTATION OF PREGNANCY: ICD-10-CM

## 2024-01-28 DIAGNOSIS — K59.00 CONSTIPATION, UNSPECIFIED: ICD-10-CM

## 2024-01-28 DIAGNOSIS — D21.9 BENIGN NEOPLASM OF CONNECTIVE AND OTHER SOFT TISSUE, UNSPECIFIED: ICD-10-CM

## 2024-01-28 DIAGNOSIS — O21.2 LATE VOMITING OF PREGNANCY: ICD-10-CM

## 2024-01-28 DIAGNOSIS — O34.219 MATERNAL CARE FOR UNSPECIFIED TYPE SCAR FROM PREVIOUS CESAREAN DELIVERY: ICD-10-CM

## 2024-01-28 DIAGNOSIS — O99.891 OTHER SPECIFIED DISEASES AND CONDITIONS COMPLICATING PREGNANCY: ICD-10-CM

## 2024-01-28 DIAGNOSIS — D64.9 ANEMIA, UNSPECIFIED: ICD-10-CM

## 2024-01-28 DIAGNOSIS — O99.012 ANEMIA COMPLICATING PREGNANCY, SECOND TRIMESTER: ICD-10-CM

## 2024-01-29 ENCOUNTER — APPOINTMENT (OUTPATIENT)
Dept: ANTEPARTUM | Facility: CLINIC | Age: 34
End: 2024-01-29

## 2024-01-29 ENCOUNTER — ASOB RESULT (OUTPATIENT)
Age: 34
End: 2024-01-29

## 2024-01-29 ENCOUNTER — APPOINTMENT (OUTPATIENT)
Dept: ANTEPARTUM | Facility: CLINIC | Age: 34
End: 2024-01-29
Payer: MEDICAID

## 2024-01-29 ENCOUNTER — OUTPATIENT (OUTPATIENT)
Dept: OUTPATIENT SERVICES | Facility: HOSPITAL | Age: 34
LOS: 1 days | Discharge: HOME | End: 2024-01-29
Payer: MEDICAID

## 2024-01-29 VITALS — HEART RATE: 76 BPM | DIASTOLIC BLOOD PRESSURE: 56 MMHG | SYSTOLIC BLOOD PRESSURE: 94 MMHG

## 2024-01-29 VITALS
TEMPERATURE: 98 F | HEART RATE: 82 BPM | DIASTOLIC BLOOD PRESSURE: 63 MMHG | RESPIRATION RATE: 16 BRPM | SYSTOLIC BLOOD PRESSURE: 102 MMHG

## 2024-01-29 DIAGNOSIS — O26.892 OTHER SPECIFIED PREGNANCY RELATED CONDITIONS, SECOND TRIMESTER: ICD-10-CM

## 2024-01-29 DIAGNOSIS — O26.893 OTHER SPECIFIED PREGNANCY RELATED CONDITIONS, THIRD TRIMESTER: ICD-10-CM

## 2024-01-29 DIAGNOSIS — O34.219 MATERNAL CARE FOR UNSPECIFIED TYPE SCAR FROM PREVIOUS CESAREAN DELIVERY: ICD-10-CM

## 2024-01-29 DIAGNOSIS — Z3A.17 17 WEEKS GESTATION OF PREGNANCY: ICD-10-CM

## 2024-01-29 DIAGNOSIS — R51.9 HEADACHE, UNSPECIFIED: ICD-10-CM

## 2024-01-29 DIAGNOSIS — O26.899 OTHER SPECIFIED PREGNANCY RELATED CONDITIONS, UNSPECIFIED TRIMESTER: ICD-10-CM

## 2024-01-29 DIAGNOSIS — D64.9 ANEMIA, UNSPECIFIED: ICD-10-CM

## 2024-01-29 DIAGNOSIS — Z98.891 HISTORY OF UTERINE SCAR FROM PREVIOUS SURGERY: Chronic | ICD-10-CM

## 2024-01-29 DIAGNOSIS — O21.2 LATE VOMITING OF PREGNANCY: ICD-10-CM

## 2024-01-29 DIAGNOSIS — D21.9 BENIGN NEOPLASM OF CONNECTIVE AND OTHER SOFT TISSUE, UNSPECIFIED: ICD-10-CM

## 2024-01-29 DIAGNOSIS — O99.891 OTHER SPECIFIED DISEASES AND CONDITIONS COMPLICATING PREGNANCY: ICD-10-CM

## 2024-01-29 DIAGNOSIS — O99.012 ANEMIA COMPLICATING PREGNANCY, SECOND TRIMESTER: ICD-10-CM

## 2024-01-29 LAB
APPEARANCE UR: CLEAR — SIGNIFICANT CHANGE UP
BILIRUB UR-MCNC: NEGATIVE — SIGNIFICANT CHANGE UP
COLOR SPEC: YELLOW — SIGNIFICANT CHANGE UP
DIFF PNL FLD: NEGATIVE — SIGNIFICANT CHANGE UP
GLUCOSE UR QL: NEGATIVE MG/DL — SIGNIFICANT CHANGE UP
HCT VFR BLD CALC: 34 % — LOW (ref 34.5–45)
HGB BLD-MCNC: 11.6 G/DL — SIGNIFICANT CHANGE UP (ref 11.5–15.5)
KETONES UR-MCNC: NEGATIVE MG/DL — SIGNIFICANT CHANGE UP
LEUKOCYTE ESTERASE UR-ACNC: NEGATIVE — SIGNIFICANT CHANGE UP
MCHC RBC-ENTMCNC: 32.1 PG — SIGNIFICANT CHANGE UP (ref 27–34)
MCHC RBC-ENTMCNC: 34.1 GM/DL — SIGNIFICANT CHANGE UP (ref 32–36)
MCV RBC AUTO: 94.2 FL — SIGNIFICANT CHANGE UP (ref 80–100)
NITRITE UR-MCNC: NEGATIVE — SIGNIFICANT CHANGE UP
NRBC # BLD: 0 /100 WBCS — SIGNIFICANT CHANGE UP (ref 0–0)
NRBC # FLD: 0 K/UL — SIGNIFICANT CHANGE UP (ref 0–0)
PH UR: 7.5 — SIGNIFICANT CHANGE UP (ref 5–8)
PLATELET # BLD AUTO: 248 K/UL — SIGNIFICANT CHANGE UP (ref 150–400)
PROT UR-MCNC: NEGATIVE MG/DL — SIGNIFICANT CHANGE UP
RBC # BLD: 3.61 M/UL — LOW (ref 3.8–5.2)
RBC # FLD: 12.6 % — SIGNIFICANT CHANGE UP (ref 10.3–14.5)
SP GR SPEC: 1.01 — SIGNIFICANT CHANGE UP (ref 1–1.03)
UROBILINOGEN FLD QL: 0.2 MG/DL — SIGNIFICANT CHANGE UP (ref 0.2–1)
WBC # BLD: 11.68 K/UL — HIGH (ref 3.8–10.5)
WBC # FLD AUTO: 11.68 K/UL — HIGH (ref 3.8–10.5)

## 2024-01-29 PROCEDURE — 76816 OB US FOLLOW-UP PER FETUS: CPT | Mod: 26

## 2024-01-29 PROCEDURE — 99221 1ST HOSP IP/OBS SF/LOW 40: CPT

## 2024-01-29 RX ORDER — SODIUM CHLORIDE 9 MG/ML
1000 INJECTION, SOLUTION INTRAVENOUS ONCE
Refills: 0 | Status: COMPLETED | OUTPATIENT
Start: 2024-01-29 | End: 2024-01-29

## 2024-01-29 RX ORDER — INDOMETHACIN 50 MG
25 CAPSULE ORAL EVERY 6 HOURS
Refills: 0 | Status: DISCONTINUED | OUTPATIENT
Start: 2024-01-29 | End: 2024-01-29

## 2024-01-29 RX ORDER — INDOMETHACIN 50 MG
25 CAPSULE ORAL ONCE
Refills: 0 | Status: COMPLETED | OUTPATIENT
Start: 2024-01-29 | End: 2024-01-29

## 2024-01-29 RX ORDER — SODIUM CHLORIDE 9 MG/ML
1000 INJECTION, SOLUTION INTRAVENOUS
Refills: 0 | Status: DISCONTINUED | OUTPATIENT
Start: 2024-01-29 | End: 2024-01-29

## 2024-01-29 RX ORDER — OXYCODONE AND ACETAMINOPHEN 5; 325 MG/1; MG/1
1 TABLET ORAL
Qty: 20 | Refills: 0
Start: 2024-01-29

## 2024-01-29 RX ORDER — INDOMETHACIN 50 MG
1 CAPSULE ORAL
Qty: 8 | Refills: 0
Start: 2024-01-29 | End: 2024-01-30

## 2024-01-29 RX ORDER — MORPHINE SULFATE 50 MG/1
4 CAPSULE, EXTENDED RELEASE ORAL ONCE
Refills: 0 | Status: DISCONTINUED | OUTPATIENT
Start: 2024-01-29 | End: 2024-01-29

## 2024-01-29 RX ADMIN — MORPHINE SULFATE 4 MILLIGRAM(S): 50 CAPSULE, EXTENDED RELEASE ORAL at 16:14

## 2024-01-29 RX ADMIN — Medication 25 MILLIGRAM(S): at 15:10

## 2024-01-29 RX ADMIN — Medication 25 MILLIGRAM(S): at 14:06

## 2024-01-29 RX ADMIN — SODIUM CHLORIDE 1000 MILLILITER(S): 9 INJECTION, SOLUTION INTRAVENOUS at 14:17

## 2024-01-29 RX ADMIN — MORPHINE SULFATE 4 MILLIGRAM(S): 50 CAPSULE, EXTENDED RELEASE ORAL at 17:15

## 2024-01-29 RX ADMIN — Medication 25 MILLIGRAM(S): at 20:16

## 2024-01-29 NOTE — OB PROVIDER TRIAGE NOTE - PLAN OF CARE
19:50-  Jp # [id number] used  - pt reports feels better, requests discharge   - indocin and percocet sent to pharmacy   - Patient to be discharged home with follow up and return precautions  - Please call [facility] clinic to set up prenatal care- [phone]  - Please return for decreased / no fetal movement, vaginal bleeding similar to that of a period, leaking / gush of fluid, regular contractions occurring 4-5 minutes  for one hour or requiring pain medication   - Patient educated of plan and demonstrates understanding. All questions answered. Discharge instructions provided and signed.     Plan d/w Dr. Franco

## 2024-01-29 NOTE — OB RN TRIAGE NOTE - CHIEF COMPLAINT QUOTE
Left abdominal pain ,diarrhea x 5, back pain same pain as 1/25/24 right sided abdominal pain started 4 days ago

## 2024-01-29 NOTE — OB PROVIDER TRIAGE NOTE - NSOBPROVIDERNOTE_OBGYN_ALL_OB_FT
Ms. YEIMI VELEZCHALINO is a 33y  @ 17w6d here for 4th visit for fibroid-related abdominal pain s/p completing full course of indocin with evidence of PTL with reassuring fetal and maternal @ this time. Ms. YEIMI ABRAHAM is a 33y  @ 17w6d here for 4th visit for fibroid-related abdominal pain s/p completing full course of indocin with evidence of PTL with reassuring fetal and maternal @ this time.    Plan  - ATU scan  - [facility]     Plan d/w Dr. Grier Ms. YEIMI ABRAHAM is a 33y  @ 17w6d here for 4th visit for fibroid-related abdominal pain s/p completing full course of indocin with evidence of PTL with reassuring fetal and maternal @ this time.    - Given warm compresses    Plan  - ATU scan  - [facility]     Plan d/w Dr. Grier Ms. YEIMI ABRAHAM is a 33y  @ 17w6d here for 4th visit for fibroid-related abdominal pain s/p completing full course of indocin with evidence of PTL with reassuring fetal and maternal @ this time.    - Given warm compresses  - ATU @ bedside. Reports 0#7 @ 30 %, placenta previa, 3 fibroids - 2 posterior QIANA approx 4x2 and 1 lateral 8.5x7  - 13:30: Dr. Nair and Dr. Mccurdy @ bedside,  ID #[id number]. Recommends indocin 25 and LR bolus, repeat CBC, UA. Re-evaluate.     Plan  - Indocin  - CBC, UA   - 1 liter LR    Plan d/w Dr. Mccurdy, PGY3 and Dr. Nair Ms. YEIMI ABRAHAM is a 33y  @ 17w6d here for 4th visit for fibroid-related abdominal pain s/p completing full course of indocin with evidence of PTL with reassuring fetal and maternal @ this time.    - Given warm compresses  - ATU @ bedside. Reports 0#7 @ 30 %, placenta previa, 3 fibroids - 2 posterior QIANA approx 4x2 and 1 lateral 8.5x7  - 13:30: Dr. Nair and Dr. Mccurdy @ bedside,  ID #[id number]. Recommends indocin 25 and LR bolus, repeat CBC, UA. Re-evaluate.   - 14:06: Pt given indocin and started fluids  - 15:30: Pt notes slight to little improvement of pain. Pt presented w option for DC home with indocin vs admission for pain management with morphine, would like time to discuss w partner.   - Pt and partner would like to stay for Morphine  - Dr. Mccurdy made aware, d/w Dr. Nair, recommends 4mg morphine IV in triage, re-eval in 2 hours    Plan  - 4mg Morphine    Plan d/w Dr. Mccurdy, PGY3 and Dr. Nair Ms. YEIMI ABRAHAM is a 33y  @ 17w6d here for 4th visit for fibroid-related abdominal pain s/p completing full course of indocin with evidence of PTL with reassuring fetal and maternal @ this time.    - Given warm compresses  - ATU @ bedside. Reports 0#7 @ 30 %, placenta previa, 3 fibroids - 2 posterior QIANA approx 4x2 and 1 lateral 8.5x7  - 13:30: Dr. Nair and Dr. Mccurdy @ bedside,  ID #[id number]. Recommends indocin 25 and LR bolus, repeat CBC, UA. Re-evaluate.   - 14:06: Pt given indocin and started fluids  - 15:30: Pt notes slight to little improvement of pain. Pt presented w option for DC home with indocin vs admission for pain management with morphine, would like time to discuss w partner.   - Pt and partner would like to stay for Morphine  - Dr. Mccurdy made aware, d/w Dr. Nair, recommends 4mg morphine IV in triage, re-eval in 2 hours    Plan  - 4mg Morphine IV   - Re-eval     Plan d/w Dr. Mccurdy, PGY3 and Dr. Nair Ms. YEIMI ABRAHAM is a 33y  @ 17w6d here for 4th visit for fibroid-related abdominal pain s/p completing full course of indocin with evidence of PTL with reassuring fetal and maternal @ this time.    - Given warm compresses  - ATU @ bedside. Reports 0#7 @ 30 %, placenta previa, 3 fibroids - 2 posterior QIANA approx 4x2 and 1 lateral 8.5x7  - 13:30: Dr. Nair and Dr. Mccurdy @ bedside,  ID #[id number]. Recommends indocin 25 and LR bolus, repeat CBC, UA. Re-evaluate.   - 14:06: Pt given indocin and started fluids  - 15:30: Pt notes slight to little improvement of pain. Pt presented w option for DC home with indocin vs admission for pain management with morphine, would like time to discuss w partner.   - Pt and partner would like to stay for Morphine  - Dr. Mccurdy made aware, d/w Dr. Nair, recommends 4mg morphine IV in triage, re-eval in 2 hours  - 17:30 Pt s/p morphine 4mg IV notes complete resolution of pain     Plan  - Continue to observe   - Re-eval @ 630    Plan d/w Dr. Mccurdy, PGY3 and Dr. Nair Ms. YEIMI ABRAHAM is a 33y  @ 17w6d here for 4th visit for fibroid-related abdominal pain s/p completing full course of indocin with evidence of PTL with reassuring fetal and maternal @ this time.    - Given warm compresses  - ATU @ bedside. Reports 0#7 @ 30 %, placenta previa, 3 fibroids - 2 posterior QIANA approx 4x2 and 1 lateral 8.5x7  - 13:30: Dr. Nair and Dr. Mccurdy @ bedside,  ID #[id number]. Recommends indocin 25 and LR bolus, repeat CBC, UA. Re-evaluate.   - 14:06: Pt given indocin and started fluids  - 15:30: Pt notes slight to little improvement of pain. Pt presented w option for DC home with indocin vs admission for pain management with morphine, would like time to discuss w partner.   - Pt and partner would like to stay for Morphine  - Dr. Mccurdy made aware, d/w Dr. Nair, recommends 4mg morphine IV in triage, re-eval in 2 hours  - 17:30 Pt s/p morphine 4mg IV notes complete resolution of pain   - 18:30: Pt notes she needs more time to rest, feels tired after receiving morphine. Provided with snacks, juice.     Plan d/w Dr. Mccurdy, PGY3 and Dr. Nair    Plan  - Continue to observe     Plan d/w Dr. Franco     Pt signed out to night team Ms. YEIMI ABRAHAM is a 33y  @ 17w6d here for 4th visit for fibroid-related abdominal pain s/p completing full course of indocin with evidence of PTL with reassuring fetal and maternal @ this time.    - Given warm compresses  - ATU @ bedside. Reports 0#7 @ 30 %, placenta previa, 3 fibroids - 2 posterior QIANA approx 4x2 and 1 lateral 8.5x7  - 13:30: Dr. Nair and Dr. Mccurdy @ bedside,  ID #[id number]. Recommends indocin 25 and LR bolus, repeat CBC, UA. Re-evaluate.   - 14:06: Pt given indocin and started fluids  - 15:30: Pt notes slight to little improvement of pain. Pt presented w option for DC home with indocin vs admission for pain management with morphine, would like time to discuss w partner.   - Pt and partner would like to stay for Morphine  - Dr. Mccurdy made aware, d/w Dr. Nair, recommends 4mg morphine IV in triage, re-eval in 2 hours  - 17:30 Pt s/p morphine 4mg IV notes complete resolution of pain   - 18:30: Pt notes she needs more time to rest, feels tired after receiving morphine. Provided with snacks, juice.     Plan d/w Dr. Mccurdy, PGY3 and Dr. Nair    Plan  - Continue to observe     Plan d/w Dr. Franco     Pt signed out to night team    19:00- report received from day shift  19:50-  Pj # [id number] used  - pt reports feels better, requests discharge   - indocin and percocet sent to pharmacy   - Patient to be discharged home with follow up and return precautions  - Please call [facility] clinic to set up prenatal care- [phone]  - Please return for decreased / no fetal movement, vaginal bleeding similar to that of a period, leaking / gush of fluid, regular contractions occurring 4-5 minutes  for one hour or requiring pain medication   - Patient educated of plan and demonstrates understanding. All questions answered. Discharge instructions provided and signed.     Plan d/w Dr. Franco Ms. YEIMI ABRAHAM is a 33y  @ 17w6d here for 4th visit for fibroid-related abdominal pain s/p completing full course of indocin with evidence of PTL with reassuring fetal and maternal @ this time.    - Given warm compresses  - ATU @ bedside. Reports 0#7 @ 30 %, placenta previa, 3 fibroids - 2 posterior QIANA approx 4x2 and 1 lateral 8.5x7  - 13:30: Dr. Nair and Dr. Mccurdy @ bedside,  ID #[id number]. Recommends indocin 25 and LR bolus, repeat CBC, UA. Re-evaluate.   - 14:06: Pt given indocin and started fluids  - 15:30: Pt notes slight to little improvement of pain. Pt presented w option for DC home with indocin vs admission for pain management with morphine, would like time to discuss w partner.   - Pt and partner would like to stay for Morphine  - Dr. Mccurdy made aware, d/w Dr. Nair, recommends 4mg morphine IV in triage, re-eval in 2 hours  - 17:30 Pt s/p morphine 4mg IV notes complete resolution of pain   - 18:30: Pt notes she needs more time to rest, feels tired after receiving morphine. Provided with snacks, juice.     Plan d/w Dr. Mccurdy, PGY3 and Dr. Nair    Plan  - Continue to observe     Plan d/w Dr. Franco     Pt signed out to night team    19:00- report received from day shift  19:50-  Jp # [id number] used  - pt reports feels better, requests discharge   - indocin and percocet sent to pharmacy   - pt requests additional dose of indocin before leaving, additional 25 mg given   - Patient to be discharged home with follow up and return precautions  - Please call [facility] clinic to set up prenatal care- [phone]  - Please return for decreased / no fetal movement, vaginal bleeding similar to that of a period, leaking / gush of fluid, regular contractions occurring 4-5 minutes  for one hour or requiring pain medication   - Patient educated of plan and demonstrates understanding. All questions answered. Discharge instructions provided and signed.     Plan d/w Dr. Franco    discharge time: 20:15 Ms. YEIMI ABRAHAM is a 33y  @ 17w6d here for 4th visit for fibroid-related abdominal pain s/p completing full course of indocin with evidence of PTL with reassuring fetal and maternal @ this time.    - Given warm compresses  - ATU @ bedside. Reports 0#7 @ 30 %, placenta previa, 3 fibroids - 2 posterior QIANA approx 4x2 and 1 lateral 8.5x7  - 13:30: Dr. Nair and Dr. Mccurdy @ bedside,  ID #[id number]. Recommends indocin 25 and LR bolus, repeat CBC, UA. Re-evaluate.   - 14:06: Pt given indocin and started fluids  - 15:30: Pt notes slight to little improvement of pain. Pt presented w option for DC home with indocin vs admission for pain management with morphine, would like time to discuss w partner.   - Pt and partner would like to stay for Morphine  - Dr. Mccurdy made aware, d/w Dr. Nair, recommends 4mg morphine IV in triage, re-eval in 2 hours  - 17:30 Pt s/p morphine 4mg IV notes complete resolution of pain   - 18:30: Pt notes she needs more time to rest, feels tired after receiving morphine. Provided with snacks, juice.     Plan d/w Dr. Mccurdy, PGY3 and Dr. Nair    Plan  - Continue to observe     Plan d/w Dr. Franco     Pt signed out to night team    19:00- report received from day shift  19:50-  Jp # [id number] used  - pt reports feels better, requests discharge   - indocin and percocet sent to pharmacy   - pt requests additional dose of indocin before leaving, additional 25 mg given   - FHR+, 130's upon discharge  - Patient to be discharged home with follow up and return precautions  - Please call [facility] clinic to set up prenatal care- [phone]  - Please return for decreased / no fetal movement, vaginal bleeding similar to that of a period, leaking / gush of fluid, regular contractions occurring 4-5 minutes  for one hour or requiring pain medication   - Patient educated of plan and demonstrates understanding. All questions answered. Discharge instructions provided and signed.     Plan d/w Dr. Franco    discharge time: 20:15

## 2024-01-29 NOTE — OB PROVIDER TRIAGE NOTE - NSHPPHYSICALEXAM_GEN_ALL_CORE
T(C): 36.5 (01-29-24 @ 08:56), Max: 36.5 (01-29-24 @ 08:34)  HR: 82 (01-29-24 @ 08:56) (82 - 82)  BP: 102/63 (01-29-24 @ 08:56) (102/63 - 102/63)  RR: 16 (01-29-24 @ 08:34) (16 - 16)  SpO2: --  General: Female visibly uncomfortable, grimacing in pain when moving.   Head: Normocephalic. Atraumatic.   Eyes: No discharge, lids normal, conjunctiva normal  Lungs: No resp distress  Abdomen: Soft, nontender. Gravid.   TAUS:  Sono saved in ASOB. To be performed by ATU.   TVUS: Saved in ASOB  NST: N/A  SSE: No erythema, edema, lesions to external genitalia. Physiologic discharge present. No active, brisk bleeding.  Negative pooling.   Neuro: No facial asymmetry, no slurred speech, moves all 4 extremities  Mood: Alert and lucid, appropriate mood and affect T(C): 36.5 (01-29-24 @ 08:56), Max: 36.5 (01-29-24 @ 08:34)  HR: 82 (01-29-24 @ 08:56) (82 - 82)  BP: 102/63 (01-29-24 @ 08:56) (102/63 - 102/63)  RR: 16 (01-29-24 @ 08:34) (16 - 16)  SpO2: --  General:  Female visibly uncomfortable, grimacing in pain when moving.   Head: Normocephalic. Atraumatic.   Eyes: No discharge, lids normal, conjunctiva normal  Lungs: No resp distress  Abdomen: Soft, nontender. Gravid.   TAUS:  Sono saved in ASOB. To be performed by ATU.   TVUS: Saved in ASOB  NST: N/A  SSE: No erythema, edema, lesions to external genitalia. Physiologic discharge present. No active, brisk bleeding.  Negative pooling.   Neuro: No facial asymmetry, no slurred speech, moves all 4 extremities  Mood: Alert and lucid, appropriate mood and affect

## 2024-01-29 NOTE — OB PROVIDER TRIAGE NOTE - ADDITIONAL INSTRUCTIONS
19:50-  Jp # [id number] used  - pt reports feels better, requests discharge   - indocin and percocet sent to pharmacy   - Patient to be discharged home with follow up and return precautions  - Please call [facility] clinic to set up prenatal care- [phone]  - Please return for decreased / no fetal movement, vaginal bleeding similar to that of a period, leaking / gush of fluid, regular contractions occurring 4-5 minutes  for one hour or requiring pain medication   - Patient educated of plan and demonstrates understanding. All questions answered. Discharge instructions provided and signed.     Plan d/w Dr. Franco

## 2024-01-29 NOTE — OB PROVIDER TRIAGE NOTE - HISTORY OF PRESENT ILLNESS
Ms. YEIMI ABRAHAM is a 33y  @ 17w6d p/w 10/10 right-sided abdominal pain that returned yday morning after completing course of indocin. Pain is constant, waxes and wanes in intensity, worse w movement. Fourth visit in last week for this pain. Seen on  at night and during day, 2 separate visits, ruled out for appendicitis given location of pain. Seen  and given full course of indocin. Pt notes that the pain significantly improved but came back after taking last dose yday. Requesting more indocin. Denies nausea, vomiting, fevers, chills, urinary c/o, vb, lof.   PNC: [facility], returns to [facility] bc too patients and too long wait time at [facility], prefers [facility].  Pt denies complications with blood pressure and/or blood sugar.  Ms. YEIMI ABRAHAM is a 33y  @ 17w6d p/w 10/10 right-sided abdominal pain that returned yday morning after completing course of indocin. Pain is constant, waxes and wanes in intensity, worse w movement. Fourth visit in last week for this pain. Seen on  at night and during day, 2 separate visits, ruled out for appendicitis given location of pain. Seen  and given full course of indocin. Pt notes that the pain significantly improved but came back after taking last dose yday. Requesting more indocin. Been having regular BMs. Last PO upon arrival to triage room, sandwich 9a. Denies nausea, vomiting, fevers, chills, urinary c/o, vb, lof.   PNC: [facility], returns to [facility] bc too patients and too long wait time at [facility], prefers [facility].  Pt denies complications with blood pressure and/or blood sugar.

## 2024-01-29 NOTE — OB RN TRIAGE NOTE - FALL HARM RISK - UNIVERSAL INTERVENTIONS
Bed in lowest position, wheels locked, appropriate side rails in place/Call bell, personal items and telephone in reach/Instruct patient to call for assistance before getting out of bed or chair/Non-slip footwear when patient is out of bed/[place] to call system/Physically safe environment - no spills, clutter or unnecessary equipment/Purposeful Proactive Rounding/Room/bathroom lighting operational, light cord in reach

## 2024-01-30 PROBLEM — Z00.00 ENCOUNTER FOR PREVENTIVE HEALTH EXAMINATION: Status: ACTIVE | Noted: 2024-01-30

## 2024-02-06 DIAGNOSIS — O21.2 LATE VOMITING OF PREGNANCY: ICD-10-CM

## 2024-02-06 DIAGNOSIS — D64.9 ANEMIA, UNSPECIFIED: ICD-10-CM

## 2024-02-06 DIAGNOSIS — O99.891 OTHER SPECIFIED DISEASES AND CONDITIONS COMPLICATING PREGNANCY: ICD-10-CM

## 2024-02-06 DIAGNOSIS — Z3A.17 17 WEEKS GESTATION OF PREGNANCY: ICD-10-CM

## 2024-02-06 DIAGNOSIS — O99.012 ANEMIA COMPLICATING PREGNANCY, SECOND TRIMESTER: ICD-10-CM

## 2024-02-06 DIAGNOSIS — O34.219 MATERNAL CARE FOR UNSPECIFIED TYPE SCAR FROM PREVIOUS CESAREAN DELIVERY: ICD-10-CM

## 2024-02-06 DIAGNOSIS — O26.892 OTHER SPECIFIED PREGNANCY RELATED CONDITIONS, SECOND TRIMESTER: ICD-10-CM

## 2024-02-06 DIAGNOSIS — R51.9 HEADACHE, UNSPECIFIED: ICD-10-CM

## 2024-02-06 DIAGNOSIS — D25.9 LEIOMYOMA OF UTERUS, UNSPECIFIED: ICD-10-CM

## 2024-06-19 ENCOUNTER — OUTPATIENT (OUTPATIENT)
Dept: INPATIENT UNIT | Facility: HOSPITAL | Age: 34
LOS: 1 days | Discharge: HOME | End: 2024-06-19
Payer: MEDICAID

## 2024-06-19 ENCOUNTER — APPOINTMENT (OUTPATIENT)
Dept: ANTEPARTUM | Facility: CLINIC | Age: 34
End: 2024-06-19
Payer: MEDICAID

## 2024-06-19 ENCOUNTER — ASOB RESULT (OUTPATIENT)
Age: 34
End: 2024-06-19

## 2024-06-19 VITALS
HEART RATE: 63 BPM | DIASTOLIC BLOOD PRESSURE: 71 MMHG | SYSTOLIC BLOOD PRESSURE: 122 MMHG | RESPIRATION RATE: 16 BRPM | TEMPERATURE: 98 F

## 2024-06-19 VITALS — DIASTOLIC BLOOD PRESSURE: 60 MMHG | SYSTOLIC BLOOD PRESSURE: 98 MMHG | HEART RATE: 65 BPM

## 2024-06-19 DIAGNOSIS — O26.899 OTHER SPECIFIED PREGNANCY RELATED CONDITIONS, UNSPECIFIED TRIMESTER: ICD-10-CM

## 2024-06-19 DIAGNOSIS — Z98.891 HISTORY OF UTERINE SCAR FROM PREVIOUS SURGERY: Chronic | ICD-10-CM

## 2024-06-19 LAB
ALBUMIN SERPL ELPH-MCNC: 3.7 G/DL — SIGNIFICANT CHANGE UP (ref 3.3–5)
ALP SERPL-CCNC: 168 U/L — HIGH (ref 40–120)
ALT FLD-CCNC: 25 U/L — SIGNIFICANT CHANGE UP (ref 4–33)
ANION GAP SERPL CALC-SCNC: 12 MMOL/L — SIGNIFICANT CHANGE UP (ref 7–14)
APPEARANCE UR: CLEAR — SIGNIFICANT CHANGE UP
AST SERPL-CCNC: 22 U/L — SIGNIFICANT CHANGE UP (ref 4–32)
BASOPHILS # BLD AUTO: 0.03 K/UL — SIGNIFICANT CHANGE UP (ref 0–0.2)
BASOPHILS NFR BLD AUTO: 0.3 % — SIGNIFICANT CHANGE UP (ref 0–2)
BILIRUB SERPL-MCNC: 0.3 MG/DL — SIGNIFICANT CHANGE UP (ref 0.2–1.2)
BILIRUB UR-MCNC: NEGATIVE — SIGNIFICANT CHANGE UP
BUN SERPL-MCNC: 6 MG/DL — LOW (ref 7–23)
CALCIUM SERPL-MCNC: 9.1 MG/DL — SIGNIFICANT CHANGE UP (ref 8.4–10.5)
CHLORIDE SERPL-SCNC: 103 MMOL/L — SIGNIFICANT CHANGE UP (ref 98–107)
CO2 SERPL-SCNC: 21 MMOL/L — LOW (ref 22–31)
COLOR SPEC: YELLOW — SIGNIFICANT CHANGE UP
CREAT ?TM UR-MCNC: 28 MG/DL — SIGNIFICANT CHANGE UP
CREAT SERPL-MCNC: 0.36 MG/DL — LOW (ref 0.5–1.3)
DIFF PNL FLD: NEGATIVE — SIGNIFICANT CHANGE UP
EGFR: 137 ML/MIN/1.73M2 — SIGNIFICANT CHANGE UP
EOSINOPHIL # BLD AUTO: 0.08 K/UL — SIGNIFICANT CHANGE UP (ref 0–0.5)
EOSINOPHIL NFR BLD AUTO: 0.9 % — SIGNIFICANT CHANGE UP (ref 0–6)
GLUCOSE SERPL-MCNC: 79 MG/DL — SIGNIFICANT CHANGE UP (ref 70–99)
GLUCOSE UR QL: NEGATIVE MG/DL — SIGNIFICANT CHANGE UP
HCT VFR BLD CALC: 38.8 % — SIGNIFICANT CHANGE UP (ref 34.5–45)
HGB BLD-MCNC: 12.8 G/DL — SIGNIFICANT CHANGE UP (ref 11.5–15.5)
IANC: 6.61 K/UL — SIGNIFICANT CHANGE UP (ref 1.8–7.4)
IMM GRANULOCYTES NFR BLD AUTO: 1 % — HIGH (ref 0–0.9)
KETONES UR-MCNC: NEGATIVE MG/DL — SIGNIFICANT CHANGE UP
LDH SERPL L TO P-CCNC: 160 U/L — SIGNIFICANT CHANGE UP (ref 135–225)
LEUKOCYTE ESTERASE UR-ACNC: NEGATIVE — SIGNIFICANT CHANGE UP
LYMPHOCYTES # BLD AUTO: 1.58 K/UL — SIGNIFICANT CHANGE UP (ref 1–3.3)
LYMPHOCYTES # BLD AUTO: 17.5 % — SIGNIFICANT CHANGE UP (ref 13–44)
MCHC RBC-ENTMCNC: 30.7 PG — SIGNIFICANT CHANGE UP (ref 27–34)
MCHC RBC-ENTMCNC: 33 GM/DL — SIGNIFICANT CHANGE UP (ref 32–36)
MCV RBC AUTO: 93 FL — SIGNIFICANT CHANGE UP (ref 80–100)
MONOCYTES # BLD AUTO: 0.66 K/UL — SIGNIFICANT CHANGE UP (ref 0–0.9)
MONOCYTES NFR BLD AUTO: 7.3 % — SIGNIFICANT CHANGE UP (ref 2–14)
NEUTROPHILS # BLD AUTO: 6.61 K/UL — SIGNIFICANT CHANGE UP (ref 1.8–7.4)
NEUTROPHILS NFR BLD AUTO: 73 % — SIGNIFICANT CHANGE UP (ref 43–77)
NITRITE UR-MCNC: NEGATIVE — SIGNIFICANT CHANGE UP
NRBC # BLD: 0 /100 WBCS — SIGNIFICANT CHANGE UP (ref 0–0)
NRBC # FLD: 0 K/UL — SIGNIFICANT CHANGE UP (ref 0–0)
PH UR: 8 — SIGNIFICANT CHANGE UP (ref 5–8)
PLATELET # BLD AUTO: 206 K/UL — SIGNIFICANT CHANGE UP (ref 150–400)
POTASSIUM SERPL-MCNC: 4.1 MMOL/L — SIGNIFICANT CHANGE UP (ref 3.5–5.3)
POTASSIUM SERPL-SCNC: 4.1 MMOL/L — SIGNIFICANT CHANGE UP (ref 3.5–5.3)
PROT ?TM UR-MCNC: 6 MG/DL — SIGNIFICANT CHANGE UP
PROT SERPL-MCNC: 6.6 G/DL — SIGNIFICANT CHANGE UP (ref 6–8.3)
PROT UR-MCNC: NEGATIVE MG/DL — SIGNIFICANT CHANGE UP
PROT/CREAT UR-RTO: 0.2 RATIO — SIGNIFICANT CHANGE UP (ref 0–0.2)
RBC # BLD: 4.17 M/UL — SIGNIFICANT CHANGE UP (ref 3.8–5.2)
RBC # FLD: 14.3 % — SIGNIFICANT CHANGE UP (ref 10.3–14.5)
SODIUM SERPL-SCNC: 136 MMOL/L — SIGNIFICANT CHANGE UP (ref 135–145)
SP GR SPEC: 1.01 — SIGNIFICANT CHANGE UP (ref 1–1.03)
URATE SERPL-MCNC: 4.8 MG/DL — SIGNIFICANT CHANGE UP (ref 2.5–7)
UROBILINOGEN FLD QL: 0.2 MG/DL — SIGNIFICANT CHANGE UP (ref 0.2–1)
WBC # BLD: 9.05 K/UL — SIGNIFICANT CHANGE UP (ref 3.8–10.5)
WBC # FLD AUTO: 9.05 K/UL — SIGNIFICANT CHANGE UP (ref 3.8–10.5)

## 2024-06-19 PROCEDURE — 76819 FETAL BIOPHYS PROFIL W/O NST: CPT | Mod: 26

## 2024-06-19 PROCEDURE — 99221 1ST HOSP IP/OBS SF/LOW 40: CPT

## 2024-06-19 PROCEDURE — 76815 OB US LIMITED FETUS(S): CPT | Mod: 26

## 2024-06-19 RX ORDER — DIPHENHYDRAMINE HCL 12.5MG/5ML
25 ELIXIR ORAL ONCE
Refills: 0 | Status: COMPLETED | OUTPATIENT
Start: 2024-06-19 | End: 2024-06-19

## 2024-06-19 RX ORDER — METOCLOPRAMIDE 5 MG/5ML
10 SOLUTION ORAL ONCE
Refills: 0 | Status: COMPLETED | OUTPATIENT
Start: 2024-06-19 | End: 2024-06-19

## 2024-06-19 RX ORDER — ACETAMINOPHEN 325 MG
1000 TABLET ORAL ONCE
Refills: 0 | Status: COMPLETED | OUTPATIENT
Start: 2024-06-19 | End: 2024-06-19

## 2024-06-19 RX ORDER — DOXYLAMINE SUCCINATE 25 MG
1 TABLET ORAL
Refills: 0 | DISCHARGE

## 2024-06-19 RX ORDER — FERROUS SULFATE 325(65) MG
0 TABLET ORAL
Refills: 0 | DISCHARGE

## 2024-06-19 RX ADMIN — Medication 25 MILLIGRAM(S): at 10:38

## 2024-06-19 RX ADMIN — METOCLOPRAMIDE 10 MILLIGRAM(S): 5 SOLUTION ORAL at 10:38

## 2024-06-19 RX ADMIN — Medication 1000 MILLIGRAM(S): at 11:08

## 2024-06-19 RX ADMIN — Medication 1000 MILLIGRAM(S): at 10:37

## 2024-06-21 DIAGNOSIS — O99.013 ANEMIA COMPLICATING PREGNANCY, THIRD TRIMESTER: ICD-10-CM

## 2024-06-21 DIAGNOSIS — O47.1 FALSE LABOR AT OR AFTER 37 COMPLETED WEEKS OF GESTATION: ICD-10-CM

## 2024-06-21 DIAGNOSIS — R51.9 HEADACHE, UNSPECIFIED: ICD-10-CM

## 2024-06-21 DIAGNOSIS — O26.893 OTHER SPECIFIED PREGNANCY RELATED CONDITIONS, THIRD TRIMESTER: ICD-10-CM

## 2024-06-21 DIAGNOSIS — D21.9 BENIGN NEOPLASM OF CONNECTIVE AND OTHER SOFT TISSUE, UNSPECIFIED: ICD-10-CM

## 2024-06-21 DIAGNOSIS — M79.89 OTHER SPECIFIED SOFT TISSUE DISORDERS: ICD-10-CM

## 2024-06-21 DIAGNOSIS — O34.219 MATERNAL CARE FOR UNSPECIFIED TYPE SCAR FROM PREVIOUS CESAREAN DELIVERY: ICD-10-CM

## 2024-06-21 DIAGNOSIS — D64.9 ANEMIA, UNSPECIFIED: ICD-10-CM

## 2024-06-21 DIAGNOSIS — Z3A.38 38 WEEKS GESTATION OF PREGNANCY: ICD-10-CM

## 2024-06-21 DIAGNOSIS — O99.891 OTHER SPECIFIED DISEASES AND CONDITIONS COMPLICATING PREGNANCY: ICD-10-CM

## 2024-06-21 DIAGNOSIS — O21.2 LATE VOMITING OF PREGNANCY: ICD-10-CM
